# Patient Record
Sex: FEMALE | Race: WHITE | NOT HISPANIC OR LATINO | Employment: OTHER | URBAN - NONMETROPOLITAN AREA
[De-identification: names, ages, dates, MRNs, and addresses within clinical notes are randomized per-mention and may not be internally consistent; named-entity substitution may affect disease eponyms.]

---

## 2022-09-28 ENCOUNTER — PATIENT ROUNDING (BHMG ONLY) (OUTPATIENT)
Dept: INTERNAL MEDICINE | Facility: CLINIC | Age: 62
End: 2022-09-28

## 2022-09-28 ENCOUNTER — OFFICE VISIT (OUTPATIENT)
Dept: INTERNAL MEDICINE | Facility: CLINIC | Age: 62
End: 2022-09-28

## 2022-09-28 VITALS
HEART RATE: 71 BPM | HEIGHT: 65 IN | BODY MASS INDEX: 28.82 KG/M2 | DIASTOLIC BLOOD PRESSURE: 88 MMHG | TEMPERATURE: 98.2 F | OXYGEN SATURATION: 99 % | WEIGHT: 173 LBS | SYSTOLIC BLOOD PRESSURE: 149 MMHG

## 2022-09-28 DIAGNOSIS — F41.9 ANXIETY: Primary | ICD-10-CM

## 2022-09-28 PROCEDURE — 99203 OFFICE O/P NEW LOW 30 MIN: CPT | Performed by: INTERNAL MEDICINE

## 2022-09-28 RX ORDER — BIOTIN 5 MG
TABLET ORAL DAILY
COMMUNITY

## 2022-09-28 RX ORDER — ALPRAZOLAM 0.5 MG/1
0.5 TABLET ORAL 2 TIMES DAILY
COMMUNITY
Start: 2022-09-01 | End: 2023-01-23 | Stop reason: SDUPTHER

## 2022-09-28 RX ORDER — TRIAMCINOLONE ACETONIDE 1 MG/G
0.1 CREAM TOPICAL DAILY PRN
COMMUNITY
End: 2023-01-31

## 2022-09-28 RX ORDER — VENLAFAXINE 75 MG/1
75 TABLET ORAL 2 TIMES DAILY
COMMUNITY
Start: 2022-09-01 | End: 2023-01-23 | Stop reason: SDUPTHER

## 2022-09-28 RX ORDER — CYCLOBENZAPRINE HCL 5 MG
5 TABLET ORAL DAILY PRN
COMMUNITY
End: 2023-01-23

## 2022-09-28 RX ORDER — FLUTICASONE PROPIONATE 50 MCG
50 SPRAY, SUSPENSION (ML) NASAL DAILY PRN
COMMUNITY
Start: 2022-07-09

## 2022-09-28 RX ORDER — LIFITEGRAST 50 MG/ML
5 SOLUTION/ DROPS OPHTHALMIC DAILY
COMMUNITY
Start: 2022-09-05

## 2022-09-28 NOTE — PROGRESS NOTES
Subjective     Chief Complaint   Patient presents with   • Anxiety     Trying to wing herself off Effexor,   • Depression       History of Present Illness  Patient was seeing [facility] in the past.   She wants to change because she needs fresh eyes on her care.     She has been taking Effexor 4 times a day. She started doing bipolar things.She was on line buying things. The refill of Effexor started to make her sick. She was vomiting and got very sick. She started cutting back.     She felt like she was having seizures. Agonizing pain. Muscle contractures in the neck and head and into the arms. Lasted for about 15 minutes and would pass. From April until Monday she was on 1 pill two times a day. Now she is on 3/4 a pill 2 times a day.     She is a good spot now and wants to come off the medications. She also knows that she needs more exercise. She states that she is in tune with everything in her body.     She had a ASD and fluttering. Had that closed by Dr. Noriega. She has had vision migraines in the past.     She has dry eyes and mouth and was DDX with Sjogres.     She does not want colon studies or mammogram.   [facility] does not do vaccination.     Patient's PMR from outside medical facility reviewed and noted.    Review of Systems   Constitutional: Negative for chills and fever.   HENT: Negative for congestion and rhinorrhea.    Respiratory: Positive for shortness of breath. Negative for cough.    Cardiovascular: Negative for chest pain and leg swelling.   Gastrointestinal: Negative for constipation and diarrhea.        Metamucil    Genitourinary: Negative for dysuria and hematuria.   Neurological: Positive for headaches.      Otherwise complete ROS reviewed and negative except as mentioned in the HPI.    Past Medical History:   Past Medical History:   Diagnosis Date   • Anxiety    • Depression    • Fibromyalgia    • Seizures (HCC)    • Sjogren's disease (HCC)      Past Surgical History:  Past Surgical  "History:   Procedure Laterality Date   • CARDIAC SURGERY     • TONSILLECTOMY       Social History:  reports that she has never smoked. She has never used smokeless tobacco. She reports previous alcohol use. She reports previous drug use.    Family History: family history includes Clotting disorder in her mother; Glaucoma in her father; Hypertension in her father.       Allergies:  Allergies   Allergen Reactions   • Bupropion Mental Status Change     Suicidal & homicidal thoughts      Medications:  Prior to Admission medications    Medication Sig Start Date End Date Taking? Authorizing Provider   ALPRAZolam (XANAX) 0.5 MG tablet Take 0.5 mg by mouth 2 (Two) Times a Day. 9/1/22  Yes Jennifer Winn MD   cyclobenzaprine (FLEXERIL) 5 MG tablet Take 5 mg by mouth Daily As Needed.   Yes Jennifer Winn MD   fluticasone (FLONASE) 50 MCG/ACT nasal spray 50 sprays by Each Nare route Daily As Needed. 7/9/22  Yes Jennifer Winn MD   Krill Oil 1000 MG capsule Daily.   Yes Jennifer Winn MD   triamcinolone (KENALOG) 0.1 % cream Apply 0.1 application topically to the appropriate area as directed Daily As Needed.   Yes Jennifer Winn MD   venlafaxine (EFFEXOR) 75 MG tablet Take 75 mg by mouth 2 (Two) Times a Day. Trying to take 3/4 of a pill instead of whole pill,  trying to wing off of medication 9/1/22  Yes Jennifer Winn MD   Xiidra 5 % ophthalmic solution Administer 5 drops to both eyes Daily. 9/5/22  Yes Jennifer Winn MD       Objective     Vital Signs: /88 (BP Location: Left arm, Patient Position: Sitting, Cuff Size: Adult)   Pulse 71   Temp 98.2 °F (36.8 °C) (Infrared)   Ht 165.1 cm (65\")   Wt 78.5 kg (173 lb)   SpO2 99%   PF 99 L/min   Breastfeeding No   BMI 28.79 kg/m²   Physical Exam  Vitals reviewed.   Constitutional:       Appearance: Normal appearance.   HENT:      Head: Normocephalic and atraumatic.      Right Ear: External ear normal.      Left Ear: " External ear normal.      Nose: Nose normal.   Eyes:      General: No scleral icterus.     Conjunctiva/sclera: Conjunctivae normal.   Cardiovascular:      Rate and Rhythm: Normal rate and regular rhythm.      Heart sounds: Normal heart sounds.   Pulmonary:      Effort: Pulmonary effort is normal.      Breath sounds: Normal breath sounds.   Musculoskeletal:         General: No swelling or tenderness.      Cervical back: Normal range of motion and neck supple.   Skin:     General: Skin is warm and dry.   Neurological:      General: No focal deficit present.      Mental Status: She is alert.      Cranial Nerves: No cranial nerve deficit.   Psychiatric:         Mood and Affect: Mood normal.         Behavior: Behavior normal.       BMI is >= 25 and <30. (Overweight) The following options were offered after discussion;: nutrition counseling/recommendations      Results Reviewed:  WBC   Date Value Ref Range Status   12/24/2014 8.25 4.80 - 10.80 K/mcL Final     Hematocrit   Date Value Ref Range Status   12/24/2014 39.5 37.0 - 47.0 % Final   12/24/2014 39.5 37.0 - 47.0 % Final     Platelets   Date Value Ref Range Status   12/24/2014 206 130 - 400 K/mcL Final   12/24/2014 206 130 - 400 K/mcL Final     Assessment / Plan     Assessment/Plan:  1. Anxiety  - Patient is weaning from her Effexor  - Patient is UTD on her Xanax refills at this time.         Return in about 4 months (around 1/28/2023) for Recheck, Next scheduled follow up. unless patient needs to be seen sooner or acute issues arise.    Code Status: Full    I have discussed the patient results/orders and and plan/recommendation with them at today's visit.      Viji Langley,    09/28/2022

## 2022-09-28 NOTE — PROGRESS NOTES
"September 28, 2022    Hello, may I speak with Tatiana Goddard?    My name is SCOTT      I am  with [facility]  [facility]  [address] [place] [address] [phone].    Before we get started may I verify your date of birth? 1960    I am calling to officially welcome you to our practice and ask about your recent visit. Is this a good time to talk? yes    Tell me about your visit with us. What things went well?  PT states the visit went really well with Dr Langley and \"she felt like they were sisters, she's like me 30 years ago\".        We're always looking for ways to make our patients' experiences even better. Do you have recommendations on ways we may improve?  no.  She really liked the clinic, it was really calm and quiet and she liked that.      Overall were you satisfied with your first visit to our practice? yes       I appreciate you taking the time to speak with me today. Is there anything else I can do for you? no      Thank you, and have a great day.      "

## 2023-01-23 ENCOUNTER — OFFICE VISIT (OUTPATIENT)
Dept: INTERNAL MEDICINE | Facility: CLINIC | Age: 63
End: 2023-01-23
Payer: COMMERCIAL

## 2023-01-23 VITALS
WEIGHT: 169.6 LBS | TEMPERATURE: 98.4 F | BODY MASS INDEX: 28.26 KG/M2 | HEART RATE: 76 BPM | OXYGEN SATURATION: 97 % | DIASTOLIC BLOOD PRESSURE: 86 MMHG | HEIGHT: 65 IN | SYSTOLIC BLOOD PRESSURE: 125 MMHG

## 2023-01-23 DIAGNOSIS — F32.5 DEPRESSION, MAJOR, IN REMISSION: ICD-10-CM

## 2023-01-23 DIAGNOSIS — F41.9 ANXIETY: ICD-10-CM

## 2023-01-23 DIAGNOSIS — L98.9 SKIN LESION: ICD-10-CM

## 2023-01-23 DIAGNOSIS — Z79.899 LONG TERM USE OF DRUG: Primary | ICD-10-CM

## 2023-01-23 PROBLEM — M25.529 PAIN IN ELBOW: Status: ACTIVE | Noted: 2018-12-03

## 2023-01-23 PROBLEM — F41.8 MIXED ANXIETY AND DEPRESSIVE DISORDER: Status: ACTIVE | Noted: 2018-03-01

## 2023-01-23 PROBLEM — G89.29 CHRONIC LOW BACK PAIN: Status: ACTIVE | Noted: 2023-01-23

## 2023-01-23 PROBLEM — K11.7 XEROSTOMIA: Status: ACTIVE | Noted: 2020-05-08

## 2023-01-23 PROBLEM — M35.00 SJOGREN'S SYNDROME: Status: ACTIVE | Noted: 2020-10-20

## 2023-01-23 PROBLEM — M25.519 SHOULDER JOINT PAIN: Status: ACTIVE | Noted: 2018-12-03

## 2023-01-23 PROBLEM — K59.03 DRUG-INDUCED CONSTIPATION: Status: ACTIVE | Noted: 2023-01-23

## 2023-01-23 PROBLEM — L30.9 ECZEMA: Status: ACTIVE | Noted: 2020-07-15

## 2023-01-23 PROBLEM — Z13.6 SCREENING FOR OTHER AND UNSPECIFIED CARDIOVASCULAR CONDITIONS: Status: ACTIVE | Noted: 2020-03-11

## 2023-01-23 PROBLEM — R23.4 SKIN DESQUAMATION: Status: ACTIVE | Noted: 2023-01-23

## 2023-01-23 PROBLEM — R06.09 DYSPNEA ON EXERTION: Status: ACTIVE | Noted: 2018-03-01

## 2023-01-23 PROBLEM — Z79.4 ENCOUNTER FOR LONG-TERM (CURRENT) USE OF INSULIN: Status: ACTIVE | Noted: 2017-08-23

## 2023-01-23 PROBLEM — F40.01 PANIC DISORDER WITH AGORAPHOBIA: Status: ACTIVE | Noted: 2021-01-15

## 2023-01-23 PROBLEM — G43.909 MIGRAINE: Status: ACTIVE | Noted: 2022-03-18

## 2023-01-23 PROBLEM — M62.838 MUSCLE SPASMS OF HEAD OR NECK: Status: ACTIVE | Noted: 2022-03-18

## 2023-01-23 PROBLEM — G47.00 PERSISTENT INSOMNIA: Status: ACTIVE | Noted: 2017-11-29

## 2023-01-23 PROBLEM — M54.50 CHRONIC LOW BACK PAIN: Status: ACTIVE | Noted: 2023-01-23

## 2023-01-23 PROBLEM — R45.86 MOOD SWINGS: Status: ACTIVE | Noted: 2021-01-15

## 2023-01-23 PROBLEM — Z13.228 SCREENING FOR OTHER INBORN ERRORS OF METABOLISM: Status: ACTIVE | Noted: 2020-03-11

## 2023-01-23 PROBLEM — M54.2 NECK PAIN: Status: ACTIVE | Noted: 2018-12-03

## 2023-01-23 PROBLEM — H61.20 IMPACTED CERUMEN: Status: ACTIVE | Noted: 2018-03-01

## 2023-01-23 PROBLEM — I10 ESSENTIAL HYPERTENSION: Status: ACTIVE | Noted: 2020-07-15

## 2023-01-23 PROBLEM — E78.2 MIXED HYPERLIPIDEMIA: Status: ACTIVE | Noted: 2020-05-08

## 2023-01-23 PROBLEM — Q21.10 ATRIAL SEPTAL DEFECT: Status: ACTIVE | Noted: 2023-01-23

## 2023-01-23 PROBLEM — M79.7 PRIMARY FIBROMYALGIA SYNDROME: Status: ACTIVE | Noted: 2023-01-23

## 2023-01-23 PROBLEM — R60.0 EDEMA OF LOWER EXTREMITY: Status: ACTIVE | Noted: 2020-07-15

## 2023-01-23 PROBLEM — J30.2 SEASONAL ALLERGIC RHINITIS: Status: ACTIVE | Noted: 2022-03-18

## 2023-01-23 PROBLEM — E66.3 OVERWEIGHT: Status: ACTIVE | Noted: 2021-11-19

## 2023-01-23 PROBLEM — R53.83 FATIGUE: Status: ACTIVE | Noted: 2023-01-23

## 2023-01-23 PROCEDURE — 99214 OFFICE O/P EST MOD 30 MIN: CPT | Performed by: INTERNAL MEDICINE

## 2023-01-23 RX ORDER — VENLAFAXINE 75 MG/1
75 TABLET ORAL 2 TIMES DAILY
Qty: 90 TABLET | Refills: 1 | Status: SHIPPED | OUTPATIENT
Start: 2023-01-23

## 2023-01-23 RX ORDER — ALPRAZOLAM 0.5 MG/1
0.5 TABLET ORAL 3 TIMES DAILY PRN
Qty: 200 TABLET | Refills: 0 | Status: SHIPPED | OUTPATIENT
Start: 2023-01-23

## 2023-01-23 NOTE — PROGRESS NOTES
Subjective     Chief Complaint   Patient presents with   • Anxiety     4 mo fu         Anxiety  Presents for follow-up visit. Patient reports no chest pain, nervous/anxious behavior or shortness of breath. Symptoms occur occasionally (Improved with medications). The quality of sleep is fair. Nighttime awakenings: occasional.     Compliance with medications is %.     She has not weaned from the Effexor yet. She came down to a 1/4 pill and states that she couldn't drive.     Patient's PMR from outside medical facility reviewed and noted.    Review of Systems   Constitutional: Negative for chills and fever.   HENT: Negative for congestion and rhinorrhea.    Respiratory: Negative for cough and shortness of breath.    Cardiovascular: Negative for chest pain and leg swelling.   Gastrointestinal: Negative for constipation and diarrhea.   Genitourinary: Negative for dysuria and hematuria.   Psychiatric/Behavioral: Positive for sleep disturbance. Negative for dysphoric mood. The patient is not nervous/anxious.       Otherwise complete ROS reviewed and negative except as mentioned in the HPI.    Past Medical History:   Past Medical History:   Diagnosis Date   • Anxiety    • Depression    • Fibromyalgia    • Seizures (HCC)    • Sjogren's disease (HCC)      Past Surgical History:  Past Surgical History:   Procedure Laterality Date   • CARDIAC SURGERY     • TONSILLECTOMY       Social History:  reports that she has never smoked. She has never used smokeless tobacco. She reports that she does not currently use alcohol. She reports that she does not currently use drugs.    Family History: family history includes Clotting disorder in her mother; Glaucoma in her father; Hypertension in her father.       Allergies:  Allergies   Allergen Reactions   • Bupropion Mental Status Change     Suicidal & homicidal thoughts      Medications:  Prior to Admission medications    Medication Sig Start Date End Date Taking? Authorizing  "Provider   ALPRAZolam (XANAX) 0.5 MG tablet Take 0.5 mg by mouth 2 (Two) Times a Day. 9/1/22  Yes Jennifer Winn MD   fluticasone (FLONASE) 50 MCG/ACT nasal spray 50 sprays by Each Nare route Daily As Needed. 7/9/22  Yes Jennifer Winn MD   Krill Oil 1000 MG capsule Daily.   Yes Jennifer Winn MD   triamcinolone (KENALOG) 0.1 % cream Apply 0.1 application topically to the appropriate area as directed Daily As Needed.   Yes Jennifer Winn MD   venlafaxine (EFFEXOR) 75 MG tablet Take 75 mg by mouth 2 (Two) Times a Day. Trying to take 3/4 of a pill instead of whole pill,  trying to wing off of medication 9/1/22  Yes Jennifer Winn MD   Xiidra 5 % ophthalmic solution Administer 5 drops to both eyes Daily. 9/5/22  Yes Jennifer Winn MD   cyclobenzaprine (FLEXERIL) 5 MG tablet Take 5 mg by mouth Daily As Needed.  1/23/23  Jennifer Winn MD       Objective     Vital Signs: /86 (BP Location: Left arm, Patient Position: Sitting, Cuff Size: Adult)   Pulse 76   Temp 98.4 °F (36.9 °C) (Infrared)   Ht 165.1 cm (65\")   Wt 76.9 kg (169 lb 9.6 oz)   SpO2 97%   BMI 28.22 kg/m²   Physical Exam  Vitals reviewed.   Constitutional:       Appearance: Normal appearance.   HENT:      Head: Normocephalic and atraumatic.      Right Ear: External ear normal.      Left Ear: External ear normal.      Nose: Nose normal.   Eyes:      General: No scleral icterus.     Conjunctiva/sclera: Conjunctivae normal.   Cardiovascular:      Rate and Rhythm: Normal rate and regular rhythm.      Heart sounds: Normal heart sounds.   Pulmonary:      Effort: Pulmonary effort is normal.      Breath sounds: Normal breath sounds.   Musculoskeletal:         General: No swelling or tenderness.      Cervical back: Normal range of motion and neck supple.   Skin:     General: Skin is warm and dry.   Neurological:      General: No focal deficit present.      Mental Status: She is alert.      Cranial Nerves: No " cranial nerve deficit.   Psychiatric:         Mood and Affect: Mood normal.         Behavior: Behavior normal.       BMI is >= 25 and <30. (Overweight) The following options were offered after discussion;: nutrition counseling/recommendations      Results Reviewed:  WBC   Date Value Ref Range Status   12/24/2014 8.25 4.80 - 10.80 K/mcL Final     Hematocrit   Date Value Ref Range Status   12/24/2014 39.5 37.0 - 47.0 % Final   12/24/2014 39.5 37.0 - 47.0 % Final     Platelets   Date Value Ref Range Status   12/24/2014 206 130 - 400 K/mcL Final   12/24/2014 206 130 - 400 K/mcL Final       Assessment / Plan     Assessment/Plan:  1. Long term use of drug  - Pain Management Profile (13 Drugs) Urine - Urine, Clean Catch    2. Depression, major, in remission (HCC)  - venlafaxine (EFFEXOR) 75 MG tablet; Take 1 tablet by mouth 2 (Two) Times a Day. Trying to take 3/4 of a pill instead of whole pill,  trying to wing off of medication  Dispense: 90 tablet; Refill: 1    3. Anxiety  - ALPRAZolam (XANAX) 0.5 MG tablet; Take 1 tablet by mouth 3 (Three) Times a Day As Needed for Anxiety.  Dispense: 200 tablet; Refill: 0    4. Skin lesions  - Refer to dermatology      Return in about 3 months (around 4/23/2023) for Recheck, Next scheduled follow up. unless patient needs to be seen sooner or acute issues arise.    Code Status: full    I have discussed the patient results/orders and and plan/recommendation with them at today's visit.      Viji Langley,    01/23/2023

## 2023-01-23 NOTE — ADDENDUM NOTE
Addended by: DIDIER MERRILL on: 1/23/2023 10:35 AM     Modules accepted: Orders, Level of Service

## 2023-01-27 NOTE — TELEPHONE ENCOUNTER
Rx Refill Note  Requested Prescriptions     Pending Prescriptions Disp Refills   • triamcinolone (KENALOG) 0.1 % cream [Pharmacy Med Name: TRIAMCINOLONE 0.1% CREAM 0.1 Cream] 45 g 1     Sig: APPLY TO HANDS AND EARS TWICE A DAY AS DIRECTED      Last office visit with prescribing clinician: 1/23/2023   Last telemedicine visit with prescribing clinician: 4/24/2023   Next office visit with prescribing clinician: 4/24/2023                         Would you like a call back once the refill request has been completed: [] Yes [] No    If the office needs to give you a call back, can they leave a voicemail: [] Yes [] No    Iqra Hurt MA  01/27/23, 11:36 CST   - - -

## 2023-01-31 RX ORDER — TRIAMCINOLONE ACETONIDE 1 MG/G
CREAM TOPICAL
Qty: 45 G | Refills: 1 | Status: SHIPPED | OUTPATIENT
Start: 2023-01-31

## 2023-04-24 ENCOUNTER — TELEPHONE (OUTPATIENT)
Dept: INTERNAL MEDICINE | Facility: CLINIC | Age: 63
End: 2023-04-24

## 2023-04-24 ENCOUNTER — OFFICE VISIT (OUTPATIENT)
Dept: INTERNAL MEDICINE | Facility: CLINIC | Age: 63
End: 2023-04-24
Payer: COMMERCIAL

## 2023-04-24 VITALS
RESPIRATION RATE: 16 BRPM | HEIGHT: 65 IN | DIASTOLIC BLOOD PRESSURE: 96 MMHG | SYSTOLIC BLOOD PRESSURE: 158 MMHG | OXYGEN SATURATION: 98 % | HEART RATE: 70 BPM | TEMPERATURE: 98.5 F | BODY MASS INDEX: 28.82 KG/M2 | WEIGHT: 173 LBS

## 2023-04-24 DIAGNOSIS — F41.9 ANXIETY: ICD-10-CM

## 2023-04-24 DIAGNOSIS — Z79.899 ENCOUNTER FOR LONG-TERM CURRENT USE OF MEDICATION: Primary | ICD-10-CM

## 2023-04-24 DIAGNOSIS — N89.8 VAGINAL IRRITATION: ICD-10-CM

## 2023-04-24 RX ORDER — ALPRAZOLAM 0.5 MG/1
0.5 TABLET ORAL 3 TIMES DAILY PRN
Qty: 200 TABLET | Refills: 0 | Status: SHIPPED | OUTPATIENT
Start: 2023-04-24

## 2023-04-24 RX ORDER — CONJUGATED ESTROGENS 0.62 MG/G
CREAM VAGINAL DAILY
Qty: 30 G | Refills: 1 | Status: SHIPPED | OUTPATIENT
Start: 2023-04-24

## 2023-04-24 NOTE — PROGRESS NOTES
Subjective     Chief Complaint   Patient presents with   • Anxiety     3 month follow up       History of Present Illness  BP is elevated today.     We discuss taking her BP at home to monitor. She does not want to take any medications at this time.     She tried to come off her Effexor. Decreased the dose but not able to completely come off the medication. Situational anxiety with her parents. Controlled with current therapy.     Peeling type of skin on her hands and occasional ear itching.     Almost allergic to her urine. Trying to change her soaps and shampoos.   No sores and no discharge. She has tried Vinegar and Yogurt. Intermittent.     Patient's PMR from outside medical facility reviewed and noted.    Review of Systems   Constitutional: Negative for chills and fever.   HENT: Negative for congestion and rhinorrhea.    Respiratory: Negative for cough and shortness of breath.    Cardiovascular: Negative for chest pain and leg swelling.   Gastrointestinal: Negative for constipation and diarrhea.   Genitourinary: Negative for dysuria and hematuria.   Skin: Positive for color change and rash.      Otherwise complete ROS reviewed and negative except as mentioned in the HPI.    Past Medical History:   Past Medical History:   Diagnosis Date   • Anxiety    • Arthritis 2010   • Depression    • Fibromyalgia    • Fibromyalgia, primary 2013   • Seizures    • Sjogren's disease    • Visual impairment 2018    Dry eye     Past Surgical History:  Past Surgical History:   Procedure Laterality Date   • CARDIAC SURGERY     • TONSILLECTOMY       Social History:  reports that she has never smoked. She has never used smokeless tobacco. She reports that she does not currently use alcohol. She reports that she does not currently use drugs.    Family History: family history includes Arthritis in her maternal grandmother; Clotting disorder in her mother; Depression in her mother; Early death in her maternal grandfather and  "mother; Glaucoma in her father; Hearing loss in her father and maternal grandmother; Hypertension in her father; Thyroid disease in her father; Vision loss in her father.      Allergies:  Allergies   Allergen Reactions   • Bupropion Mental Status Change     Suicidal & homicidal thoughts      Medications:  Prior to Admission medications    Medication Sig Start Date End Date Taking? Authorizing Provider   ALPRAZolam (XANAX) 0.5 MG tablet Take 1 tablet by mouth 3 (Three) Times a Day As Needed for Anxiety. 1/23/23  Yes Viji Langley DO   fluticasone (FLONASE) 50 MCG/ACT nasal spray 50 sprays by Each Nare route Daily As Needed. 7/9/22  Yes ProviderJennifer MD   Krill Oil 1000 MG capsule Daily.   Yes ProviderJennifer MD   triamcinolone (KENALOG) 0.1 % cream APPLY TO HANDS AND EARS TWICE A DAY AS DIRECTED 1/31/23  Yes Viji Langley DO   venlafaxine (EFFEXOR) 75 MG tablet Take 1 tablet by mouth 2 (Two) Times a Day. Trying to take 3/4 of a pill instead of whole pill,  trying to wing off of medication 1/23/23  Yes Viji Langley DO   Xiidra 5 % ophthalmic solution Administer 5 drops to both eyes Daily. 9/5/22  Yes ProviderJennifer MD       Objective     Vital Signs: /97 (BP Location: Left arm, Patient Position: Sitting, Cuff Size: Adult)   Pulse 75   Temp 98.5 °F (36.9 °C) (Infrared)   Resp 16   Ht 165.1 cm (65\")   Wt 78.5 kg (173 lb)   SpO2 98%   BMI 28.79 kg/m²   Physical Exam  Vitals reviewed.   Constitutional:       Appearance: Normal appearance.   HENT:      Head: Normocephalic and atraumatic.      Right Ear: External ear normal.      Left Ear: External ear normal.      Nose: Nose normal.   Eyes:      General: No scleral icterus.     Conjunctiva/sclera: Conjunctivae normal.   Cardiovascular:      Rate and Rhythm: Normal rate.   Pulmonary:      Effort: Pulmonary effort is normal. No respiratory distress.   Musculoskeletal:         General: No swelling or tenderness.      " Cervical back: Normal range of motion and neck supple.   Skin:     General: Skin is warm and dry.   Neurological:      General: No focal deficit present.      Mental Status: She is alert.      Cranial Nerves: No cranial nerve deficit.   Psychiatric:         Mood and Affect: Mood normal.         Behavior: Behavior normal.       BMI is >= 25 and <30. (Overweight) The following options were offered after discussion;: nutrition counseling/recommendations      Results Reviewed:  WBC   Date Value Ref Range Status   12/24/2014 8.25 4.80 - 10.80 K/mcL Final     Hematocrit   Date Value Ref Range Status   12/24/2014 39.5 37.0 - 47.0 % Final   12/24/2014 39.5 37.0 - 47.0 % Final     Platelets   Date Value Ref Range Status   12/24/2014 206 130 - 400 K/mcL Final   12/24/2014 206 130 - 400 K/mcL Final        Assessment / Plan     Assessment/Plan:  1. Encounter for long-term current use of medication  - Pain Management Profile (13 Drugs) Urine - Urine, Clean Catch    2. Anxiety  - Pain Management Profile (13 Drugs) Urine - Urine, Clean Catch  - ALPRAZolam (XANAX) 0.5 MG tablet; Take 1 tablet by mouth 3 (Three) Times a Day As Needed for Anxiety.  Dispense: 200 tablet; Refill: 0    3. Vaginal irritation  - Estrogens Conjugated (Premarin) 0.625 MG/GM vaginal cream; Insert  into the vagina Daily.  Dispense: 30 g; Refill: 1        Return in about 3 months (around 7/24/2023) for Recheck, Next scheduled follow up. unless patient needs to be seen sooner or acute issues arise.    Code Status: Full    I have discussed the patient results/orders and and plan/recommendation with them at today's visit.      Viji Langley, DO   04/24/2023        Answers for HPI/ROS submitted by the patient on 4/17/2023  Please describe your symptoms.: Rx refills  Have you had these symptoms before?: Yes  How long have you been having these symptoms?: Greater than 2 weeks  What is the primary reason for your visit?: Other

## 2023-04-26 LAB
AMPHETAMINES UR QL SCN: NEGATIVE NG/ML
BARBITURATES UR QL SCN: NEGATIVE NG/ML
BENZODIAZ UR QL SCN: NEGATIVE NG/ML
BZE UR QL SCN: NEGATIVE NG/ML
CANNABINOIDS UR QL SCN: NEGATIVE NG/ML
CREAT UR-MCNC: 68.3 MG/DL (ref 20–300)
FENTANYL UR-MCNC: NEGATIVE PG/ML
LABORATORY COMMENT REPORT: NORMAL
MEPERIDINE UR QL: NEGATIVE NG/ML
METHADONE UR QL SCN: NEGATIVE NG/ML
OPIATES UR QL SCN: NEGATIVE NG/ML
OXYCODONE+OXYMORPHONE UR QL SCN: NEGATIVE NG/ML
PCP UR QL: NEGATIVE NG/ML
PH UR: 5.9 [PH] (ref 4.5–8.9)
PROPOXYPH UR QL SCN: NEGATIVE NG/ML
SP GR UR: 1.02
TRAMADOL UR QL SCN: NEGATIVE NG/ML

## 2023-04-26 NOTE — TELEPHONE ENCOUNTER
1 g
Attempted to call pharmacy. Phone only gives busy tone. 
How much is patient supposed to use at a time? 
Pharmacy aware. 
Pharmacy called wanting to know the dosage of Premarin Cream      Estrogens Conjugated (Premarin) 0.625 MG/GM vaginal cream [0635] (Order [id number])      
Pharmacy needs to know if you  want 0.5 g, 1 g, 1.5 g or 2 grams 
Abdominal Pain, N/V/D

## 2023-05-09 ENCOUNTER — TELEPHONE (OUTPATIENT)
Dept: INTERNAL MEDICINE | Facility: CLINIC | Age: 63
End: 2023-05-09
Payer: COMMERCIAL

## 2023-05-09 DIAGNOSIS — N89.8 VAGINAL ITCHING: Primary | ICD-10-CM

## 2023-05-09 RX ORDER — FLUCONAZOLE 200 MG/1
200 TABLET ORAL DAILY
Qty: 3 TABLET | Refills: 1 | Status: SHIPPED | OUTPATIENT
Start: 2023-05-09

## 2023-05-09 NOTE — TELEPHONE ENCOUNTER
Caller: Tatiana Goddard    Relationship: Self    Best call back number: [phone]    What medication are you requesting: ANTIBIOTIC    What are your current symptoms: ITCHING AROUND VAGINAL AREA     How long have you been experiencing symptoms: A FEW WEEKS     Have you had these symptoms before:    [x] Yes  [] No    Have you been treated for these symptoms before:   [x] Yes  [] No    If a prescription is needed, what is your preferred pharmacy and phone number: [facility] [address] [phone] [facility] [phone]      Additional notes:    PATIENT STATES SHE WAS PRESCRIBED MEDICATION FOR THESE SYMPTOMS, HOWEVER HER SYMPTOMS HAVE NOT IMPROVED. PATIENT IS WONDERING IF AN ANTIBIOTIC COULD ALSO BE PRESCRIBED?

## 2023-05-18 ENCOUNTER — PATIENT MESSAGE (OUTPATIENT)
Dept: INTERNAL MEDICINE | Facility: CLINIC | Age: 63
End: 2023-05-18

## 2023-05-18 DIAGNOSIS — I10 ESSENTIAL HYPERTENSION: Primary | ICD-10-CM

## 2023-05-29 RX ORDER — AMLODIPINE BESYLATE 5 MG/1
5 TABLET ORAL DAILY
Qty: 30 TABLET | Refills: 1 | Status: SHIPPED | OUTPATIENT
Start: 2023-05-29

## 2023-05-30 DIAGNOSIS — N89.8 VAGINAL ITCHING: ICD-10-CM

## 2023-05-30 RX ORDER — FLUCONAZOLE 200 MG/1
200 TABLET ORAL DAILY
Qty: 3 TABLET | Refills: 1 | OUTPATIENT
Start: 2023-05-30

## 2023-08-09 ENCOUNTER — PATIENT OUTREACH (OUTPATIENT)
Dept: CASE MANAGEMENT | Facility: OTHER | Age: 63
End: 2023-08-09
Payer: COMMERCIAL

## 2023-08-09 NOTE — OUTREACH NOTE
AMBULATORY CASE MANAGEMENT NOTE    Name and Relationship of Patient/Support Person: Tatiana Goddard S - Self    Patient Outreach    RN-ACM outreach to patient to offer enrollment in [employer] [facility] for assistance with education and management of hypertension.    [email or web address]    Was the enrollment attempt to reach the patient successful?: yes  Date/Time of successful contact: 8/9/2023  2:29 PM  Patient response: not interested       Ellie WILSON  Ambulatory Case Management    8/9/2023, 14:29 CDT

## 2023-08-10 ENCOUNTER — OFFICE VISIT (OUTPATIENT)
Dept: INTERNAL MEDICINE | Facility: CLINIC | Age: 63
End: 2023-08-10
Payer: COMMERCIAL

## 2023-08-10 VITALS
SYSTOLIC BLOOD PRESSURE: 128 MMHG | WEIGHT: 177.2 LBS | OXYGEN SATURATION: 98 % | HEART RATE: 81 BPM | HEIGHT: 65 IN | TEMPERATURE: 97.5 F | RESPIRATION RATE: 17 BRPM | BODY MASS INDEX: 29.52 KG/M2 | DIASTOLIC BLOOD PRESSURE: 82 MMHG

## 2023-08-10 DIAGNOSIS — F41.9 ANXIETY: ICD-10-CM

## 2023-08-10 DIAGNOSIS — I10 ESSENTIAL HYPERTENSION: ICD-10-CM

## 2023-08-10 DIAGNOSIS — F41.9 ANXIETY: Primary | ICD-10-CM

## 2023-08-10 PROCEDURE — 99213 OFFICE O/P EST LOW 20 MIN: CPT

## 2023-08-10 RX ORDER — AMLODIPINE BESYLATE 5 MG/1
5 TABLET ORAL DAILY
Qty: 90 TABLET | Refills: 0 | Status: SHIPPED | OUTPATIENT
Start: 2023-08-10

## 2023-08-10 NOTE — PROGRESS NOTES
"        Subjective     Chief Complaint   Patient presents with    Anxiety     Follow up.     Hypertension       History of Present Illness  Patient presents today for anxiety follow up. Currently takes xanax 0.5mg three times a day. Patient states it works wonderful for her. Denies any side effects. States she was rececntly started on amlodipine for hypertension management, does check BP at home and has been running \"really good.\" States that the only complaint with the Norvasc does cause very mild swelling in ankles, however, she does not want to switch medications at this time because the swelling in the afternoons does not cause pain, is not severe, and the medication works well controlling her BP. States she will let us know if swelling becomes worse.     Patient's PMR from outside medical facility reviewed and noted.    Review of Systems   Constitutional:  Negative for activity change, fatigue and unexpected weight change.   HENT:  Negative for congestion, ear pain, mouth sores, sore throat and trouble swallowing.    Eyes:  Negative for discharge and visual disturbance.   Respiratory:  Negative for cough and shortness of breath.    Cardiovascular:  Positive for leg swelling. Negative for chest pain.   Gastrointestinal:  Negative for abdominal pain, constipation, diarrhea and nausea.   Genitourinary:  Negative for decreased urine volume, difficulty urinating, dysuria and hematuria.   Musculoskeletal:  Negative for back pain and gait problem.   Skin:  Negative for color change and rash.   Allergic/Immunologic: Negative for environmental allergies and immunocompromised state.   Neurological:  Negative for speech difficulty, weakness and headaches.   Psychiatric/Behavioral:  Negative for confusion and sleep disturbance. The patient is nervous/anxious.       Otherwise complete ROS reviewed and negative except as mentioned in the HPI.    Past Medical History:   Past Medical History:   Diagnosis Date    Anxiety     " Arthritis 2010    Depression     Fibromyalgia     Fibromyalgia, primary 2013    Seizures     Sjogren's disease     Visual impairment 2018    Dry eye     Past Surgical History:  Past Surgical History:   Procedure Laterality Date    CARDIAC SURGERY      TONSILLECTOMY       Social History:  reports that she has never smoked. She has never used smokeless tobacco. She reports that she does not currently use alcohol. She reports that she does not currently use drugs.    Family History: family history includes Arthritis in her maternal grandmother; Clotting disorder in her mother; Depression in her mother; Early death in her maternal grandfather and mother; Glaucoma in her father; Hearing loss in her father and maternal grandmother; Hypertension in her father; Thyroid disease in her father; Vision loss in her father.      Allergies:  Allergies   Allergen Reactions    Bupropion Mental Status Change     Suicidal & homicidal thoughts      Medications:  Prior to Admission medications    Medication Sig Start Date End Date Taking? Authorizing Provider   ALPRAZolam (XANAX) 0.5 MG tablet Take 1 tablet by mouth 3 (Three) Times a Day As Needed for Anxiety. 4/24/23  Yes Viji Langley DO   amLODIPine (NORVASC) 5 MG tablet TAKE 1 TABLET BY MOUTH DAILY. 6/26/23  Yes Viji Langley DO   fluticasone (FLONASE) 50 MCG/ACT nasal spray 50 sprays by Each Nare route Daily As Needed. 7/9/22  Yes Jennifer Winn MD   triamcinolone (KENALOG) 0.1 % cream APPLY TO HANDS AND EARS TWICE A DAY AS DIRECTED 1/31/23  Yes Viji Langley DO   venlafaxine (EFFEXOR) 75 MG tablet TAKE 1 TABLET BY MOUTH 2 (TWO) TIMES A DAY. TRYING TO TAKE 3/4 OF A PILL INSTEAD OF WHOLE PILL, TRYING TO WEAN OFF OF MEDICATION 6/26/23  Yes Viji Langley DO   Xiidra 5 % ophthalmic solution Administer 5 drops to both eyes Daily. 9/5/22  Yes Jennifer Winn MD   Estrogens Conjugated (Premarin) 0.625 MG/GM vaginal cream Insert  into the vagina  "Daily.  Patient not taking: Reported on 8/10/2023 4/24/23   Viji Langley DO   fluconazole (Diflucan) 200 MG tablet Take 1 tablet by mouth Daily.  Patient not taking: Reported on 8/10/2023 5/9/23   Viji Langley DO   Krill Oil 1000 MG capsule Daily.  Patient not taking: Reported on 8/10/2023    Provider, MD Jennifer       Objective     Vital Signs: /82 (BP Location: Left arm, Patient Position: Sitting, Cuff Size: Adult)   Pulse 81   Temp 97.5 øF (36.4 øC) (Skin)   Resp 17   Ht 165.1 cm (65\")   Wt 80.4 kg (177 lb 3.2 oz)   SpO2 98%   BMI 29.49 kg/mý   Physical Exam  Vitals and nursing note reviewed.   Constitutional:       General: She is not in acute distress.     Appearance: Normal appearance. She is not ill-appearing.   HENT:      Head: Normocephalic and atraumatic.      Right Ear: External ear normal.      Left Ear: External ear normal.      Nose: Nose normal.      Mouth/Throat:      Mouth: Mucous membranes are moist.      Pharynx: No posterior oropharyngeal erythema.   Eyes:      General: No scleral icterus.     Extraocular Movements: Extraocular movements intact.      Conjunctiva/sclera: Conjunctivae normal.      Pupils: Pupils are equal, round, and reactive to light.   Cardiovascular:      Rate and Rhythm: Normal rate and regular rhythm.      Pulses: Normal pulses.      Heart sounds: Normal heart sounds.   Pulmonary:      Effort: Pulmonary effort is normal. No respiratory distress.   Abdominal:      General: Abdomen is flat. Bowel sounds are normal.      Palpations: Abdomen is soft.      Tenderness: There is no abdominal tenderness.   Musculoskeletal:         General: Normal range of motion.      Cervical back: Normal range of motion.      Right lower leg: No edema.      Left lower leg: No edema.   Skin:     General: Skin is warm and dry.      Findings: No erythema or rash.   Neurological:      General: No focal deficit present.      Mental Status: She is alert and oriented to " person, place, and time. Mental status is at baseline.      Motor: No weakness.   Psychiatric:         Mood and Affect: Mood normal.         Behavior: Behavior normal.         Thought Content: Thought content normal.         Judgment: Judgment normal.            Results Reviewed:  WBC   Date Value Ref Range Status   12/24/2014 8.25 4.80 - 10.80 K/mcL Final     Hematocrit   Date Value Ref Range Status   12/24/2014 39.5 37.0 - 47.0 % Final   12/24/2014 39.5 37.0 - 47.0 % Final     Platelets   Date Value Ref Range Status   12/24/2014 206 130 - 400 K/mcL Final   12/24/2014 206 130 - 400 K/mcL Final         Assessment / Plan     Assessment/Plan:  1. Essential hypertension  - amLODIPine (NORVASC) 5 MG tablet; Take 1 tablet by mouth Daily.  Dispense: 90 tablet; Refill: 0  -patient not interested in my chart care companion. Advised if leg swelling worsens to return.      2. Anxiety  -UDS and CSA UTD.   -reports anxiety controlled on current medication regimen.       Return for Annual physical due. unless patient needs to be seen sooner or acute issues arise.      I have discussed the patient results/orders and and plan/recommendation with them at today's visit.      Samantha Kendrick, ROBINSON   08/10/2023

## 2023-08-10 NOTE — TELEPHONE ENCOUNTER
Rx Refill Note  Requested Prescriptions     Pending Prescriptions Disp Refills    ALPRAZolam (XANAX) 0.5 MG tablet 200 tablet 0     Sig: Take 1 tablet by mouth 3 (Three) Times a Day As Needed for Anxiety.      Last office visit with office: 8/10/2023  Next office visit with office: 10/23/2023    UDS: Pain Management Profile (13 Drugs) Urine - Urine, Clean Catch (07/19/2023 01:00)     DATE OF LAST REFILL: 04/24/2023      Controlled Substance Agreement: up to date               Iqra Hurt MA  08/10/23, 14:56 CDT

## 2023-08-15 RX ORDER — ALPRAZOLAM 0.5 MG/1
0.5 TABLET ORAL 3 TIMES DAILY PRN
Qty: 200 TABLET | Refills: 0 | Status: SHIPPED | OUTPATIENT
Start: 2023-08-15

## 2023-08-16 ENCOUNTER — TELEPHONE (OUTPATIENT)
Dept: INTERNAL MEDICINE | Facility: CLINIC | Age: 63
End: 2023-08-16
Payer: COMMERCIAL

## 2023-08-25 DIAGNOSIS — I10 ESSENTIAL HYPERTENSION: Primary | ICD-10-CM

## 2023-08-25 RX ORDER — LISINOPRIL 10 MG/1
10 TABLET ORAL DAILY
Qty: 90 TABLET | Refills: 0 | Status: SHIPPED | OUTPATIENT
Start: 2023-08-25

## 2023-11-01 ENCOUNTER — OFFICE VISIT (OUTPATIENT)
Dept: INTERNAL MEDICINE | Facility: CLINIC | Age: 63
End: 2023-11-01
Payer: COMMERCIAL

## 2023-11-01 VITALS
HEART RATE: 64 BPM | HEIGHT: 65 IN | WEIGHT: 175 LBS | TEMPERATURE: 96 F | BODY MASS INDEX: 29.16 KG/M2 | DIASTOLIC BLOOD PRESSURE: 81 MMHG | OXYGEN SATURATION: 98 % | SYSTOLIC BLOOD PRESSURE: 115 MMHG

## 2023-11-01 DIAGNOSIS — F32.5 DEPRESSION, MAJOR, IN REMISSION: ICD-10-CM

## 2023-11-01 DIAGNOSIS — F41.9 ANXIETY: ICD-10-CM

## 2023-11-01 DIAGNOSIS — I10 ESSENTIAL HYPERTENSION: ICD-10-CM

## 2023-11-01 DIAGNOSIS — L20.82 FLEXURAL ECZEMA: ICD-10-CM

## 2023-11-01 DIAGNOSIS — Z12.11 COLON CANCER SCREENING: ICD-10-CM

## 2023-11-01 DIAGNOSIS — Z00.00 WELLNESS EXAMINATION: ICD-10-CM

## 2023-11-01 DIAGNOSIS — Z11.59 ENCOUNTER FOR HEPATITIS C SCREENING TEST FOR LOW RISK PATIENT: Primary | ICD-10-CM

## 2023-11-01 DIAGNOSIS — T78.40XD ALLERGY, SUBSEQUENT ENCOUNTER: ICD-10-CM

## 2023-11-01 DIAGNOSIS — M25.561 ACUTE PAIN OF BOTH KNEES: ICD-10-CM

## 2023-11-01 DIAGNOSIS — M25.562 ACUTE PAIN OF BOTH KNEES: ICD-10-CM

## 2023-11-01 PROBLEM — R45.86 MOOD SWINGS: Status: RESOLVED | Noted: 2021-01-15 | Resolved: 2023-11-01

## 2023-11-01 PROBLEM — H61.20 IMPACTED CERUMEN: Status: RESOLVED | Noted: 2018-03-01 | Resolved: 2023-11-01

## 2023-11-01 PROBLEM — T78.40XA ALLERGIES: Status: ACTIVE | Noted: 2023-11-01

## 2023-11-01 PROBLEM — J30.2 SEASONAL ALLERGIC RHINITIS: Status: RESOLVED | Noted: 2022-03-18 | Resolved: 2023-11-01

## 2023-11-01 PROBLEM — Z13.228 SCREENING FOR OTHER INBORN ERRORS OF METABOLISM: Status: RESOLVED | Noted: 2020-03-11 | Resolved: 2023-11-01

## 2023-11-01 RX ORDER — ALPRAZOLAM 0.5 MG/1
0.5 TABLET ORAL 2 TIMES DAILY
Qty: 60 TABLET | Refills: 2 | Status: SHIPPED | OUTPATIENT
Start: 2023-11-01

## 2023-11-01 RX ORDER — VENLAFAXINE 75 MG/1
75 TABLET ORAL 2 TIMES DAILY
Qty: 180 TABLET | Refills: 3 | Status: SHIPPED | OUTPATIENT
Start: 2023-11-01

## 2023-11-01 RX ORDER — KETOROLAC TROMETHAMINE 30 MG/ML
60 INJECTION, SOLUTION INTRAMUSCULAR; INTRAVENOUS ONCE
Status: COMPLETED | OUTPATIENT
Start: 2023-11-01 | End: 2023-11-01

## 2023-11-01 RX ORDER — LISINOPRIL 10 MG/1
10 TABLET ORAL DAILY
Qty: 90 TABLET | Refills: 3 | Status: SHIPPED | OUTPATIENT
Start: 2023-11-01

## 2023-11-01 RX ORDER — FLUTICASONE PROPIONATE 50 MCG
2 SPRAY, SUSPENSION (ML) NASAL DAILY PRN
Qty: 16 G | Refills: 3 | Status: SHIPPED | OUTPATIENT
Start: 2023-11-01

## 2023-11-01 RX ORDER — TRIAMCINOLONE ACETONIDE 1 MG/G
CREAM TOPICAL
Qty: 45 G | Refills: 3 | Status: SHIPPED | OUTPATIENT
Start: 2023-11-01

## 2023-11-01 RX ORDER — METHYLPREDNISOLONE ACETATE 80 MG/ML
80 INJECTION, SUSPENSION INTRA-ARTICULAR; INTRALESIONAL; INTRAMUSCULAR; SOFT TISSUE ONCE
Status: COMPLETED | OUTPATIENT
Start: 2023-11-01 | End: 2023-11-01

## 2023-11-01 RX ADMIN — KETOROLAC TROMETHAMINE 60 MG: 30 INJECTION, SOLUTION INTRAMUSCULAR; INTRAVENOUS at 08:04

## 2023-11-01 RX ADMIN — METHYLPREDNISOLONE ACETATE 80 MG: 80 INJECTION, SUSPENSION INTRA-ARTICULAR; INTRALESIONAL; INTRAMUSCULAR; SOFT TISSUE at 08:05

## 2023-11-01 NOTE — PROGRESS NOTES
Chief Complaint  Knee Pain and Annual Exam    Subjective        Tatiana Goddard presents to [facility]  Knee Pain         Tatiana Goddard is a 63 y.o. female who presents for a routine visit at this time.  Comes in for her general annual wellness exam today.  She states that overall she has been doing well.  She would like to get his routine labs done at this time including a CBC comprehensive metabolic panel lipid profile as well as her thyroid.  Patient does agree to do a Cologuard at this time for colon cancer screening.    I discussed at length preventative and cancer screening with her and its role in reducing types of cancer and other health problems.  Patient understands the risks and benefits of screening and has decided that at this time she does not wish to do any preventative screening.  Patient declines breast and cervical cancer screening, abdominal aortic aneurysm screening, Bone density screening and other preventative screening protocols.  I advised her that I will bring this up in the future to see if she has changed their mind on preventative screening and wellness checks   patient did agree to a cologuard for colon cancer screening at this time.    Patient stepped into a hole 3 weeks ago.  She states that she twisted her ankle but that fealt better by the next day.  She states that her left knee has rita bothering her since that time and now both knees have been bother ing her.  She states that her knee has bothered her in the past but they are worse than they have been would like to get a steroid and Toradol shot to help with this at this time.    Patient complains of her fibromyalgia pain as well as anxiety and depression.  Has been on Effexor in the past we will go ahead and u this medication at this time.  Patient also states that her anxiety continues to be well controlled on her current dose of alprazolam.  She states she is currently taking her  "alprazolam twice daily.  She reports that she does not have any panic attacks while taking the medication and is happy with her current dose reports not taking extra doses or other issues.    Patient states that her allergies and eczema remain well controlled at this time.  Patient likewise states that her hypertension continues to be well on her current dose of lisinopril.  Objective   Vital Signs:  /81 (BP Location: Left arm, Patient Position: Sitting, Cuff Size: Adult)   Pulse 64   Temp 96 °F (35.6 °C) (Infrared)   Ht 165.1 cm (65\")   Wt 79.4 kg (175 lb)   SpO2 98%   BMI 29.12 kg/m²   Estimated body mass index is 29.12 kg/m² as calculated from the following:    Height as of this encounter: 165.1 cm (65\").    Weight as of this encounter: 79.4 kg (175 lb).       Past Medical History:   Diagnosis Date    Anxiety     Arthritis 2010    Depression     Fibromyalgia     Fibromyalgia, primary 2013    Seizures     Sjogren's disease     Visual impairment 2018    Dry eye       Past Surgical History:   Procedure Laterality Date    CARDIAC SURGERY      TONSILLECTOMY         Social History     Tobacco Use    Smoking status: Never    Smokeless tobacco: Never   Vaping Use    Vaping Use: Never used   Substance Use Topics    Alcohol use: Not Currently    Drug use: Not Currently       Family History   Problem Relation Age of Onset    Clotting disorder Mother     Depression Mother     Early death Mother         Blood clot after surgery--was given double dose of coagulant    Hypertension Father     Hearing loss Father     Thyroid disease Father     Vision loss Father     Arthritis Maternal Grandmother     Hearing loss Maternal Grandmother     Early death Maternal Grandfather        Allergies as of 11/01/2023 - Reviewed 11/01/2023   Allergen Reaction Noted    Bupropion Mental Status Change 09/28/2022         Current Outpatient Medications:     ALPRAZolam (XANAX) 0.5 MG tablet, Take 1 tablet by mouth 2 (Two) Times a Day., " Disp: 60 tablet, Rfl: 2    fluticasone (FLONASE) 50 MCG/ACT nasal spray, 2 sprays by Each Nare route Daily As Needed for Allergies., Disp: 16 g, Rfl: 3    Krill Oil 1000 MG capsule, Daily., Disp: , Rfl:     lisinopril (PRINIVIL,ZESTRIL) 10 MG tablet, Take 1 tablet by mouth Daily., Disp: 90 tablet, Rfl: 3    triamcinolone (KENALOG) 0.1 % cream, Apply to affected area bid, Disp: 45 g, Rfl: 3    venlafaxine (EFFEXOR) 75 MG tablet, Take 1 tablet by mouth 2 (Two) Times a Day., Disp: 180 tablet, Rfl: 3    Xiidra 5 % ophthalmic solution, Administer 5 drops to both eyes Daily., Disp: , Rfl:   No current facility-administered medications for this visit.          Physical Exam  Vitals and nursing note reviewed.   Constitutional:       General: She is not in acute distress.     Appearance: Normal appearance.   HENT:      Head: Normocephalic.   Eyes:      Extraocular Movements: Extraocular movements intact.      Pupils: Pupils are equal, round, and reactive to light.   Cardiovascular:      Rate and Rhythm: Normal rate and regular rhythm.      Heart sounds: Normal heart sounds. No murmur heard.  Pulmonary:      Effort: Pulmonary effort is normal. No respiratory distress.      Breath sounds: Normal breath sounds. No rhonchi or rales.   Abdominal:      General: Abdomen is flat. Bowel sounds are normal.      Palpations: Abdomen is soft.   Neurological:      General: No focal deficit present.      Mental Status: She is alert.        Result Review :                   Assessment and Plan   Diagnoses and all orders for this visit:    1. Encounter for hepatitis C screening test for low risk patient (Primary)  -     Hepatitis C Antibody; Future    2. Wellness examination  -     CBC & Differential  -     Comprehensive Metabolic Panel  -     Lipid Panel  -     TSH    3. Colon cancer screening  -     Cologuard - Stool, Per Rectum; Future    4. Acute pain of both knees  -     XR Knee 3 View Left; Future  -     XR Knee 3 View Right; Future  -      methylPREDNISolone acetate (DEPO-medrol) injection 80 mg  -     ketorolac (TORADOL) injection 60 mg    5. Anxiety  -     ALPRAZolam (XANAX) 0.5 MG tablet; Take 1 tablet by mouth 2 (Two) Times a Day.  Dispense: 60 tablet; Refill: 2    6. Essential hypertension  -     lisinopril (PRINIVIL,ZESTRIL) 10 MG tablet; Take 1 tablet by mouth Daily.  Dispense: 90 tablet; Refill: 3    7. Depression, major, in remission  -     venlafaxine (EFFEXOR) 75 MG tablet; Take 1 tablet by mouth 2 (Two) Times a Day.  Dispense: 180 tablet; Refill: 3    8. Flexural eczema  -     triamcinolone (KENALOG) 0.1 % cream; Apply to affected area bid  Dispense: 45 g; Refill: 3    9. Allergy, subsequent encounter  -     fluticasone (FLONASE) 50 MCG/ACT nasal spray; 2 sprays by Each Nare route Daily As Needed for Allergies.  Dispense: 16 g; Refill: 3        As part of this patient's treatment plan, I am prescribing controlled substances. The patient has been made aware of appropriate use of such medications, including potential risk of somnolence, limited ability to drive and /or work safely, and potential for dependence or overdose. It has also been made clear that these medications are for use by this patient only, without concomitant use of alcohol or other substances unless prescribed.  Patient has been advised that PRN or as needed pain medication allows the patient to skip doses if not needed, but that the medication is not to be taken more often or at higher doses than prescribed.    Patient has completed prescribing agreement detailing terms of continued prescribing of controlled substances, including monitoring KENTRELL reports, urine drug screening, and pill counts if necessary. The patient is aware that inappropriate use will result in cessation of prescribing such medications, and possible termination from this practice.    History and physical exam exhibit continued safe and appropriate use of controlled substances.    1.  Controlled  substance abuse agreement discussed and copy in record: YES  2.  Discussed:   Risk of addiction: YES   Specific risk of medications:YES   Side effects of medications: YES   Reasonable expectations of the medication: YES  3.  Treatment Objectives:   Discussed management of constipation: YES   Discussed management of other side effects: YES  4.  eKASPER:     KENTRELL report has been reviewed by: Cristóbal Chen MD on 11/01/23 in the PDMD in the electronic medical record.  [unfilled]   Results/Date of last KENTRELL:  appropriate  5.  Results/Date of last drug screen:  appropriate  6.  Follow up plan:    Follow Up in One Months Time              Continue on current medications as directed    EMR Dictation/Transcription disclaimer:   Some of this note may be an electronic transcription/translation of spoken language to printed text. The electronic translation of spoken language may permit erroneous, or at times, nonsensical words or phrases to be inadvertently transcribed; Although I have reviewed the note for such errors, some may still exist.          Follow Up   Return in about 3 months (around 2/1/2024).  Patient was given instructions and counseling regarding her condition or for health maintenance advice. Please see specific information pulled into the AVS if appropriate.

## 2023-11-02 DIAGNOSIS — E03.9 ACQUIRED HYPOTHYROIDISM: Primary | ICD-10-CM

## 2023-11-02 DIAGNOSIS — E78.2 MIXED HYPERLIPIDEMIA: ICD-10-CM

## 2023-11-02 LAB
ALBUMIN SERPL-MCNC: 4.6 G/DL (ref 3.9–4.9)
ALBUMIN/GLOB SERPL: 1.7 {RATIO} (ref 1.2–2.2)
ALP SERPL-CCNC: 115 IU/L (ref 44–121)
ALT SERPL-CCNC: 20 IU/L (ref 0–32)
AST SERPL-CCNC: 17 IU/L (ref 0–40)
BASOPHILS # BLD AUTO: 0.1 X10E3/UL (ref 0–0.2)
BASOPHILS NFR BLD AUTO: 1 %
BILIRUB SERPL-MCNC: 0.3 MG/DL (ref 0–1.2)
BUN SERPL-MCNC: 15 MG/DL (ref 8–27)
BUN/CREAT SERPL: 17 (ref 12–28)
CALCIUM SERPL-MCNC: 9.6 MG/DL (ref 8.7–10.3)
CHLORIDE SERPL-SCNC: 103 MMOL/L (ref 96–106)
CHOLEST SERPL-MCNC: 255 MG/DL (ref 100–199)
CO2 SERPL-SCNC: 24 MMOL/L (ref 20–29)
CREAT SERPL-MCNC: 0.87 MG/DL (ref 0.57–1)
EGFRCR SERPLBLD CKD-EPI 2021: 75 ML/MIN/1.73
EOSINOPHIL # BLD AUTO: 0.3 X10E3/UL (ref 0–0.4)
EOSINOPHIL NFR BLD AUTO: 4 %
ERYTHROCYTE [DISTWIDTH] IN BLOOD BY AUTOMATED COUNT: 13.4 % (ref 11.7–15.4)
GLOBULIN SER CALC-MCNC: 2.7 G/DL (ref 1.5–4.5)
GLUCOSE SERPL-MCNC: 81 MG/DL (ref 70–99)
HCT VFR BLD AUTO: 40.5 % (ref 34–46.6)
HDLC SERPL-MCNC: 42 MG/DL
HGB BLD-MCNC: 13.6 G/DL (ref 11.1–15.9)
IMM GRANULOCYTES # BLD AUTO: 0 X10E3/UL (ref 0–0.1)
IMM GRANULOCYTES NFR BLD AUTO: 0 %
LDLC SERPL CALC-MCNC: 171 MG/DL (ref 0–99)
LYMPHOCYTES # BLD AUTO: 2.1 X10E3/UL (ref 0.7–3.1)
LYMPHOCYTES NFR BLD AUTO: 31 %
MCH RBC QN AUTO: 28 PG (ref 26.6–33)
MCHC RBC AUTO-ENTMCNC: 33.6 G/DL (ref 31.5–35.7)
MCV RBC AUTO: 83 FL (ref 79–97)
MONOCYTES # BLD AUTO: 0.5 X10E3/UL (ref 0.1–0.9)
MONOCYTES NFR BLD AUTO: 7 %
NEUTROPHILS # BLD AUTO: 3.9 X10E3/UL (ref 1.4–7)
NEUTROPHILS NFR BLD AUTO: 57 %
PLATELET # BLD AUTO: 291 X10E3/UL (ref 150–450)
POTASSIUM SERPL-SCNC: 4.5 MMOL/L (ref 3.5–5.2)
PROT SERPL-MCNC: 7.3 G/DL (ref 6–8.5)
RBC # BLD AUTO: 4.86 X10E6/UL (ref 3.77–5.28)
SODIUM SERPL-SCNC: 141 MMOL/L (ref 134–144)
TRIGL SERPL-MCNC: 222 MG/DL (ref 0–149)
TSH SERPL DL<=0.005 MIU/L-ACNC: 4.57 UIU/ML (ref 0.45–4.5)
VLDLC SERPL CALC-MCNC: 42 MG/DL (ref 5–40)
WBC # BLD AUTO: 6.9 X10E3/UL (ref 3.4–10.8)

## 2023-11-02 RX ORDER — SIMVASTATIN 10 MG
10 TABLET ORAL NIGHTLY
Qty: 90 TABLET | Refills: 3 | Status: SHIPPED | OUTPATIENT
Start: 2023-11-02

## 2023-11-02 RX ORDER — LEVOTHYROXINE SODIUM 0.05 MG/1
50 TABLET ORAL DAILY
Qty: 90 TABLET | Refills: 3 | Status: SHIPPED | OUTPATIENT
Start: 2023-11-02

## 2023-11-15 ENCOUNTER — OFFICE VISIT (OUTPATIENT)
Dept: INTERNAL MEDICINE | Facility: CLINIC | Age: 63
End: 2023-11-15
Payer: COMMERCIAL

## 2023-11-15 VITALS
BODY MASS INDEX: 28.99 KG/M2 | SYSTOLIC BLOOD PRESSURE: 114 MMHG | WEIGHT: 174 LBS | HEIGHT: 65 IN | OXYGEN SATURATION: 98 % | HEART RATE: 80 BPM | DIASTOLIC BLOOD PRESSURE: 79 MMHG | TEMPERATURE: 97.8 F

## 2023-11-15 DIAGNOSIS — F41.9 ANXIETY: ICD-10-CM

## 2023-11-15 DIAGNOSIS — M25.561 ACUTE PAIN OF BOTH KNEES: Primary | ICD-10-CM

## 2023-11-15 DIAGNOSIS — I10 ESSENTIAL HYPERTENSION: ICD-10-CM

## 2023-11-15 DIAGNOSIS — M25.562 ACUTE PAIN OF BOTH KNEES: Primary | ICD-10-CM

## 2023-11-15 RX ORDER — TRIAMCINOLONE ACETONIDE 40 MG/ML
40 INJECTION, SUSPENSION INTRA-ARTICULAR; INTRAMUSCULAR ONCE
Status: COMPLETED | OUTPATIENT
Start: 2023-11-15 | End: 2023-11-15

## 2023-11-15 RX ORDER — CLOPIDOGREL BISULFATE 75 MG/1
75 TABLET ORAL DAILY
COMMUNITY

## 2023-11-15 RX ORDER — VITAMIN E (DL,TOCOPHERYL ACET) 180 MG
10 CAPSULE ORAL DAILY PRN
COMMUNITY

## 2023-11-15 RX ORDER — LIDOCAINE HYDROCHLORIDE 10 MG/ML
1 INJECTION, SOLUTION INFILTRATION; PERINEURAL ONCE
Status: COMPLETED | OUTPATIENT
Start: 2023-11-15 | End: 2023-11-15

## 2023-11-15 RX ADMIN — LIDOCAINE HYDROCHLORIDE 1 ML: 10 INJECTION, SOLUTION INFILTRATION; PERINEURAL at 09:33

## 2023-11-15 RX ADMIN — TRIAMCINOLONE ACETONIDE 40 MG: 40 INJECTION, SUSPENSION INTRA-ARTICULAR; INTRAMUSCULAR at 09:33

## 2023-11-15 RX ADMIN — TRIAMCINOLONE ACETONIDE 40 MG: 40 INJECTION, SUSPENSION INTRA-ARTICULAR; INTRAMUSCULAR at 09:34

## 2023-11-15 RX ADMIN — LIDOCAINE HYDROCHLORIDE 1 ML: 10 INJECTION, SOLUTION INFILTRATION; PERINEURAL at 09:32

## 2023-11-15 NOTE — PROGRESS NOTES
"Chief Complaint  Knee Pain    Subjective        aTtiana Goddard presents to [facility]  History of Present Illness    Patient requested a joint injection today due to significant joint pain.  This has been going on since her last visit and is getting worse.  Risk and benefits of doing a knee injection were discussed with this patient prior to the injection.  Area was cleaned with alcohol prior to the injection and anatomy was located for proper insertion into the joint.  1 cc of Kenalog and 1 cc of lidocaine were injected into  Tatiana Goddard's bilateral knee using the lateral approach.   The needle was removed and sterile dressing was applied.  Patient tolerated the procedure well.     Anxiety remains stable on current dose of alprazolam, and htn remains stable at this time.      Objective   Vital Signs:  /79 (BP Location: Left arm, Patient Position: Sitting, Cuff Size: Adult)   Pulse 80   Temp 97.8 °F (36.6 °C) (Infrared)   Ht 165.1 cm (65\")   Wt 78.9 kg (174 lb)   SpO2 98%   BMI 28.96 kg/m²   Estimated body mass index is 28.96 kg/m² as calculated from the following:    Height as of this encounter: 165.1 cm (65\").    Weight as of this encounter: 78.9 kg (174 lb).       Past Medical History:   Diagnosis Date    Anxiety     Arthritis 2010    Depression     Fibromyalgia     Fibromyalgia, primary 2013    Seizures     Sjogren's disease     Visual impairment 2018    Dry eye       Past Surgical History:   Procedure Laterality Date    CARDIAC SURGERY      TONSILLECTOMY         Social History     Tobacco Use    Smoking status: Never    Smokeless tobacco: Never   Vaping Use    Vaping Use: Never used   Substance Use Topics    Alcohol use: Not Currently    Drug use: Not Currently       Family History   Problem Relation Age of Onset    Clotting disorder Mother     Depression Mother     Early death Mother         Blood clot after surgery--was given double dose of coagulant "    Hypertension Father     Hearing loss Father     Thyroid disease Father     Vision loss Father     Arthritis Maternal Grandmother     Hearing loss Maternal Grandmother     Early death Maternal Grandfather        Allergies as of 11/15/2023 - Reviewed 11/15/2023   Allergen Reaction Noted    Bupropion Mental Status Change 09/28/2022         Current Outpatient Medications:     ALPRAZolam (XANAX) 0.5 MG tablet, Take 1 tablet by mouth 2 (Two) Times a Day., Disp: 60 tablet, Rfl: 2    clopidogrel (PLAVIX) 75 MG tablet, Take 1 tablet by mouth Daily., Disp: , Rfl:     fluticasone (FLONASE) 50 MCG/ACT nasal spray, 2 sprays by Each Nare route Daily As Needed for Allergies., Disp: 16 g, Rfl: 3    Krill Oil 1000 MG capsule, Daily., Disp: , Rfl:     levothyroxine (Synthroid) 50 MCG tablet, Take 1 tablet by mouth Daily., Disp: 90 tablet, Rfl: 3    lisinopril (PRINIVIL,ZESTRIL) 10 MG tablet, Take 1 tablet by mouth Daily., Disp: 90 tablet, Rfl: 3    Melatonin 12 MG tablet dispersible, Take 10 mg by mouth Daily As Needed., Disp: , Rfl:     simvastatin (Zocor) 10 MG tablet, Take 1 tablet by mouth Every Night., Disp: 90 tablet, Rfl: 3    triamcinolone (KENALOG) 0.1 % cream, Apply to affected area bid, Disp: 45 g, Rfl: 3    venlafaxine (EFFEXOR) 75 MG tablet, Take 1 tablet by mouth 2 (Two) Times a Day., Disp: 180 tablet, Rfl: 3    Xiidra 5 % ophthalmic solution, Administer 5 drops to both eyes Daily., Disp: , Rfl:   No current facility-administered medications for this visit.          Physical Exam  Vitals and nursing note reviewed.   Constitutional:       General: Tatiana Goddard is not in acute distress.     Appearance: Normal appearance.   HENT:      Head: Normocephalic.   Eyes:      Extraocular Movements: Extraocular movements intact.      Pupils: Pupils are equal, round, and reactive to light.   Cardiovascular:      Rate and Rhythm: Normal rate and regular rhythm.      Heart sounds: Normal heart sounds. No murmur  heard.  Pulmonary:      Effort: Pulmonary effort is normal. No respiratory distress.      Breath sounds: Normal breath sounds. No rhonchi or rales.   Abdominal:      General: Abdomen is flat. Bowel sounds are normal.      Palpations: Abdomen is soft.   Neurological:      General: No focal deficit present.      Mental Status: Tatiana Goddard is alert.        Result Review :                   Assessment and Plan   Diagnoses and all orders for this visit:    1. Acute pain of both knees (Primary)  -     lidocaine (XYLOCAINE) 1 % injection 1 mL  -     triamcinolone acetonide (KENALOG-40) injection 40 mg  -     lidocaine (XYLOCAINE) 1 % injection 1 mL  -     triamcinolone acetonide (KENALOG-40) injection 40 mg    2. Anxiety    3. Essential hypertension      EMR Dictation/Transcription disclaimer:   Some of this note may be an electronic transcription/translation of spoken language to printed text. The electronic translation of spoken language may permit erroneous, or at times, nonsensical words or phrases to be inadvertently transcribed; Although I have reviewed the note for such errors, some may still exist.          Follow Up   Return in about 3 months (around 2/15/2024).  Patient was given instructions and counseling regarding Tatiana Goddard's condition or for health maintenance advice. Please see specific information pulled into the AVS if appropriate.

## 2023-12-14 RX ORDER — PRAVASTATIN SODIUM 10 MG
10 TABLET ORAL DAILY
Qty: 30 TABLET | Refills: 11 | Status: SHIPPED | OUTPATIENT
Start: 2023-12-14

## 2024-02-27 ENCOUNTER — OFFICE VISIT (OUTPATIENT)
Dept: INTERNAL MEDICINE | Facility: CLINIC | Age: 64
End: 2024-02-27
Payer: COMMERCIAL

## 2024-02-27 VITALS
BODY MASS INDEX: 28.69 KG/M2 | RESPIRATION RATE: 16 BRPM | WEIGHT: 172.2 LBS | DIASTOLIC BLOOD PRESSURE: 91 MMHG | SYSTOLIC BLOOD PRESSURE: 135 MMHG | HEIGHT: 65 IN | HEART RATE: 55 BPM | TEMPERATURE: 98.4 F | OXYGEN SATURATION: 97 %

## 2024-02-27 DIAGNOSIS — Z11.59 ENCOUNTER FOR HEPATITIS C SCREENING TEST FOR LOW RISK PATIENT: ICD-10-CM

## 2024-02-27 DIAGNOSIS — E03.9 ACQUIRED HYPOTHYROIDISM: ICD-10-CM
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
## 2024-02-27 RX ORDER — MUPIROCIN CALCIUM 20 MG/G
1 CREAM TOPICAL 3 TIMES DAILY
Qty: 15 G | Refills: 0 | Status: SHIPPED | OUTPATIENT
Start: 2024-02-27

## 2024-02-27 RX ORDER — SULFAMETHOXAZOLE AND TRIMETHOPRIM 800; 160 MG/1; MG/1
1 TABLET ORAL 2 TIMES DAILY
Qty: 14 TABLET | Refills: 0 | Status: SHIPPED | OUTPATIENT
Start: 2024-02-27 | End: 2024-03-05

## 2024-02-27 RX ORDER — METHYLPREDNISOLONE ACETATE 40 MG/ML
80 INJECTION, SUSPENSION INTRA-ARTICULAR; INTRALESIONAL; INTRAMUSCULAR; SOFT TISSUE ONCE
Status: SHIPPED | OUTPATIENT
Start: 2024-02-27

## 2024-02-27 NOTE — PROGRESS NOTES
"Chief Complaint  Insect Bite (On face and hands)    Subjective        Tatiana Goddard presents to [facility]  Insect Bite      Tatiana Goddard is a 63 y.o. adult who presents for a routine visit at this time.  Comes in send secondary second lesions across the left portion of her face and on her left hand.  She states that she was working outside and believes that she got bitten by insects.  She states that she has had significant pain associated with these bites and would like something to help with this at this time.  Areas around them are red inflamed and appear to be infected a believe this to be secondary to a staph infection that is gotten into the area.  Will go ahead and start the patient on some antibiotics as well as giving her a steroid shot due to the fact that this could be secondary to some inflammation and some Bactroban to go over the affected areas    Objective   Vital Signs:  /91 (BP Location: Right arm, Patient Position: Sitting, Cuff Size: Adult)   Pulse 55   Temp 98.4 °F (36.9 °C) (Skin)   Resp 16   Ht 165.1 cm (65\")   Wt 78.1 kg (172 lb 3.2 oz)   SpO2 97%   BMI 28.66 kg/m²   Estimated body mass index is 28.66 kg/m² as calculated from the following:    Height as of this encounter: 165.1 cm (65\").    Weight as of this encounter: 78.1 kg (172 lb 3.2 oz).       Past Medical History:   Diagnosis Date    Anxiety     Arthritis 2010    Depression     Fibromyalgia     Fibromyalgia, primary 2013    Seizures     Sjogren's disease     Visual impairment 2018    Dry eye       Past Surgical History:   Procedure Laterality Date    CARDIAC SURGERY      TONSILLECTOMY         Social History     Tobacco Use    Smoking status: Never    Smokeless tobacco: Never   Vaping Use    Vaping Use: Never used   Substance Use Topics    Alcohol use: Not Currently    Drug use: Not Currently       Family History   Problem Relation Age of Onset    Clotting disorder Mother     " Depression Mother     Early death Mother         Blood clot after surgery--was given double dose of coagulant    Hypertension Father     Hearing loss Father     Thyroid disease Father     Vision loss Father     Arthritis Maternal Grandmother     Hearing loss Maternal Grandmother     Early death Maternal Grandfather        Allergies as of 02/27/2024 - Reviewed 02/27/2024   Allergen Reaction Noted    Bupropion Mental Status Change 09/28/2022         Current Outpatient Medications:     ALPRAZolam (XANAX) 0.5 MG tablet, Take 1 tablet by mouth 2 (Two) Times a Day., Disp: 60 tablet, Rfl: 2    fluticasone (FLONASE) 50 MCG/ACT nasal spray, 2 sprays by Each Nare route Daily As Needed for Allergies., Disp: 16 g, Rfl: 3    Krill Oil 1000 MG capsule, Daily., Disp: , Rfl:     levothyroxine (Synthroid) 50 MCG tablet, Take 1 tablet by mouth Daily., Disp: 90 tablet, Rfl: 3    lisinopril (PRINIVIL,ZESTRIL) 10 MG tablet, Take 1 tablet by mouth Daily., Disp: 90 tablet, Rfl: 3    Melatonin 12 MG tablet dispersible, Take 10 mg by mouth Daily As Needed., Disp: , Rfl:     triamcinolone (KENALOG) 0.1 % cream, Apply to affected area bid, Disp: 45 g, Rfl: 3    venlafaxine (EFFEXOR) 75 MG tablet, Take 1 tablet by mouth 2 (Two) Times a Day., Disp: 180 tablet, Rfl: 3    Xiidra 5 % ophthalmic solution, Administer 5 drops to both eyes Daily., Disp: , Rfl:     clopidogrel (PLAVIX) 75 MG tablet, Take 1 tablet by mouth Daily., Disp: , Rfl:     mupirocin (BACTROBAN) 2 % cream, Apply 1 Application topically to the appropriate area as directed 3 (Three) Times a Day., Disp: 15 g, Rfl: 0    pravastatin (PRAVACHOL) 10 MG tablet, Take 1 tablet by mouth Daily., Disp: 30 tablet, Rfl: 11    sulfamethoxazole-trimethoprim (Bactrim DS) 800-160 MG per tablet, Take 1 tablet by mouth 2 (Two) Times a Day for 7 days., Disp: 14 tablet, Rfl: 0    Current Facility-Administered Medications:     methylPREDNISolone acetate (DEPO-medrol) injection 80 mg, 80 mg,  Intramuscular, Once, Cristóbal Chen MD          Physical Exam  Vitals and nursing note reviewed.   Constitutional:       General: Tatiana Goddard is not in acute distress.     Appearance: Normal appearance.   HENT:      Head: Normocephalic.   Eyes:      Extraocular Movements: Extraocular movements intact.      Pupils: Pupils are equal, round, and reactive to light.   Cardiovascular:      Rate and Rhythm: Normal rate and regular rhythm.      Heart sounds: Normal heart sounds. No murmur heard.  Pulmonary:      Effort: Pulmonary effort is normal. No respiratory distress.      Breath sounds: Normal breath sounds. No rhonchi or rales.   Abdominal:      General: Abdomen is flat. Bowel sounds are normal.      Palpations: Abdomen is soft.   Skin:     Findings: Erythema, lesion and wound present.          Neurological:      General: No focal deficit present.      Mental Status: Tatiana Goddard is alert.        Result Review :                   Assessment and Plan   Diagnoses and all orders for this visit:    1. Bug bite, initial encounter (Primary)  -     sulfamethoxazole-trimethoprim (Bactrim DS) 800-160 MG per tablet; Take 1 tablet by mouth 2 (Two) Times a Day for 7 days.  Dispense: 14 tablet; Refill: 0  -     methylPREDNISolone acetate (DEPO-medrol) injection 80 mg  -     mupirocin (BACTROBAN) 2 % cream; Apply 1 Application topically to the appropriate area as directed 3 (Three) Times a Day.  Dispense: 15 g; Refill: 0    2. Encounter for hepatitis C screening test for low risk patient  -     Hepatitis C Antibody    3. Acquired hypothyroidism  -     TSH        EMR Dictation/Transcription disclaimer:   Some of this note may be an electronic transcription/translation of spoken language to printed text. The electronic translation of spoken language may permit erroneous, or at times, nonsensical words or phrases to be inadvertently transcribed; Although I have reviewed the note for such errors, some may  still exist.          Follow Up   No follow-ups on file.  Patient was given instructions and counseling regarding Tatiana Goddard's condition or for health maintenance advice. Please see specific information pulled into the AVS if appropriate.

## 2024-02-29 LAB
HCV IGG SERPL QL IA: NON REACTIVE
Lab: NORMAL
TSH SERPL DL<=0.005 MIU/L-ACNC: 1.28 UIU/ML (ref 0.45–4.5)

## 2024-03-14 RX ORDER — VENLAFAXINE HYDROCHLORIDE 75 MG/1
75 CAPSULE, EXTENDED RELEASE ORAL DAILY
Qty: 90 CAPSULE | Refills: 0 | Status: SHIPPED | OUTPATIENT
Start: 2024-03-14 | End: 2024-03-18

## 2024-03-14 RX ORDER — LISINOPRIL 10 MG/1
10 TABLET ORAL DAILY
Qty: 90 TABLET | Refills: 0 | Status: SHIPPED | OUTPATIENT
Start: 2024-03-14 | End: 2024-03-18 | Stop reason: SDUPTHER

## 2024-03-14 RX ORDER — LEVOTHYROXINE SODIUM 0.05 MG/1
50 TABLET ORAL DAILY
Qty: 90 TABLET | Refills: 0 | Status: SHIPPED | OUTPATIENT
Start: 2024-03-14 | End: 2024-03-18 | Stop reason: SDUPTHER

## 2024-03-18 RX ORDER — LISINOPRIL 10 MG/1
10 TABLET ORAL DAILY
Qty: 90 TABLET | Refills: 1 | Status: SHIPPED | OUTPATIENT
Start: 2024-03-18

## 2024-03-18 RX ORDER — VENLAFAXINE 75 MG/1
75 TABLET ORAL 2 TIMES DAILY
Qty: 180 TABLET | Refills: 1 | Status: SHIPPED | OUTPATIENT
Start: 2024-03-18

## 2024-03-18 RX ORDER — LEVOTHYROXINE SODIUM 0.05 MG/1
50 TABLET ORAL DAILY
Qty: 90 TABLET | Refills: 1 | Status: SHIPPED | OUTPATIENT
Start: 2024-03-18

## 2024-08-26 RX ORDER — LISINOPRIL 10 MG/1
10 TABLET ORAL DAILY
Qty: 30 TABLET | Refills: 0 | Status: SHIPPED | OUTPATIENT
Start: 2024-08-26

## 2024-08-26 RX ORDER — LEVOTHYROXINE SODIUM 50 UG/1
50 TABLET ORAL DAILY
Qty: 30 TABLET | Refills: 0 | Status: SHIPPED | OUTPATIENT
Start: 2024-08-26

## 2024-10-28 DIAGNOSIS — T78.40XD ALLERGY, SUBSEQUENT ENCOUNTER: ICD-10-CM

## 2024-10-28 RX ORDER — FLUTICASONE PROPIONATE 50 MCG
2 SPRAY, SUSPENSION (ML) NASAL DAILY PRN
Qty: 16 G | Refills: 3 | Status: SHIPPED | OUTPATIENT
Start: 2024-10-28